# Patient Record
Sex: MALE | Race: WHITE | NOT HISPANIC OR LATINO | Employment: OTHER | URBAN - METROPOLITAN AREA
[De-identification: names, ages, dates, MRNs, and addresses within clinical notes are randomized per-mention and may not be internally consistent; named-entity substitution may affect disease eponyms.]

---

## 2023-04-24 LAB
ALANINE AMINOTRANSFERASE (SGPT) (U/L) IN SER/PLAS: 40 U/L (ref 10–52)
ALBUMIN (G/DL) IN SER/PLAS: 4.3 G/DL (ref 3.4–5)
ALKALINE PHOSPHATASE (U/L) IN SER/PLAS: 80 U/L (ref 33–136)
ASPARTATE AMINOTRANSFERASE (SGOT) (U/L) IN SER/PLAS: 26 U/L (ref 9–39)
BILIRUBIN DIRECT (MG/DL) IN SER/PLAS: 0.1 MG/DL (ref 0–0.3)
BILIRUBIN TOTAL (MG/DL) IN SER/PLAS: 0.6 MG/DL (ref 0–1.2)
CHOLESTEROL (MG/DL) IN SER/PLAS: 152 MG/DL (ref 0–199)
CHOLESTEROL IN HDL (MG/DL) IN SER/PLAS: 45.2 MG/DL
CHOLESTEROL/HDL RATIO: 3.4
CREATINE KINASE (U/L) IN SER/PLAS: 301 U/L (ref 0–325)
LDL: 84 MG/DL (ref 0–99)
PROTEIN TOTAL: 7.4 G/DL (ref 6.4–8.2)
TRIGLYCERIDE (MG/DL) IN SER/PLAS: 113 MG/DL (ref 0–149)
VLDL: 23 MG/DL (ref 0–40)

## 2023-05-20 DIAGNOSIS — K21.9 GASTRO-ESOPHAGEAL REFLUX DISEASE WITHOUT ESOPHAGITIS: ICD-10-CM

## 2023-05-22 PROBLEM — L30.9 ECZEMA: Status: ACTIVE | Noted: 2023-05-22

## 2023-05-22 PROBLEM — R42 VERTIGO: Status: ACTIVE | Noted: 2023-05-22

## 2023-05-22 PROBLEM — K21.9 CHRONIC GERD: Status: ACTIVE | Noted: 2023-05-22

## 2023-05-22 PROBLEM — E78.5 HLD (HYPERLIPIDEMIA): Status: ACTIVE | Noted: 2023-05-22

## 2023-05-22 PROBLEM — F11.20 OPIOID DEPENDENCE (MULTI): Status: ACTIVE | Noted: 2023-05-22

## 2023-05-22 PROBLEM — J06.9 VIRAL URI WITH COUGH: Status: ACTIVE | Noted: 2023-05-22

## 2023-05-22 PROBLEM — I10 BENIGN ESSENTIAL HTN: Status: ACTIVE | Noted: 2023-05-22

## 2023-05-22 PROBLEM — M43.10 DEGENERATIVE SPONDYLOLISTHESIS: Status: ACTIVE | Noted: 2023-05-22

## 2023-05-22 PROBLEM — Z86.79 HISTORY OF ATRIAL FIBRILLATION: Status: ACTIVE | Noted: 2023-05-22

## 2023-05-22 PROBLEM — M48.062 LUMBAR STENOSIS WITH NEUROGENIC CLAUDICATION: Status: ACTIVE | Noted: 2023-05-22

## 2023-05-22 PROBLEM — I25.10 CAD (CORONARY ARTERY DISEASE): Status: ACTIVE | Noted: 2023-05-22

## 2023-05-22 RX ORDER — TRIAMCINOLONE ACETONIDE 1 MG/G
OINTMENT TOPICAL
COMMUNITY
Start: 2020-01-13

## 2023-05-22 RX ORDER — MOLNUPIRAVIR 200 MG/1
CAPSULE ORAL
COMMUNITY
Start: 2022-11-26

## 2023-05-22 RX ORDER — METOPROLOL TARTRATE 25 MG/1
25 TABLET, FILM COATED ORAL 2 TIMES DAILY
COMMUNITY

## 2023-05-22 RX ORDER — AZITHROMYCIN 250 MG/1
TABLET, FILM COATED ORAL
COMMUNITY
Start: 2021-01-06

## 2023-05-22 RX ORDER — ROSUVASTATIN CALCIUM 20 MG/1
20 TABLET, COATED ORAL DAILY
COMMUNITY

## 2023-05-22 RX ORDER — CLOPIDOGREL BISULFATE 75 MG/1
75 TABLET ORAL DAILY
COMMUNITY

## 2023-05-22 RX ORDER — OMEPRAZOLE 20 MG/1
20 CAPSULE, DELAYED RELEASE ORAL DAILY PRN
COMMUNITY
End: 2023-12-08

## 2023-05-22 RX ORDER — METOPROLOL TARTRATE 100 MG/1
TABLET ORAL
COMMUNITY

## 2023-05-22 RX ORDER — MECLIZINE HYDROCHLORIDE 25 MG/1
TABLET ORAL 4 TIMES DAILY
COMMUNITY
Start: 2017-11-22

## 2023-05-22 RX ORDER — PREDNISONE 20 MG/1
TABLET ORAL
COMMUNITY
Start: 2021-01-06

## 2023-05-23 RX ORDER — OMEPRAZOLE 20 MG/1
20 CAPSULE, DELAYED RELEASE ORAL
Qty: 90 CAPSULE | Refills: 3 | Status: SHIPPED | OUTPATIENT
Start: 2023-05-23 | End: 2023-11-09 | Stop reason: SDUPTHER

## 2023-11-09 DIAGNOSIS — K21.9 GASTRO-ESOPHAGEAL REFLUX DISEASE WITHOUT ESOPHAGITIS: ICD-10-CM

## 2023-11-09 RX ORDER — OMEPRAZOLE 20 MG/1
20 CAPSULE, DELAYED RELEASE ORAL
Qty: 90 CAPSULE | Refills: 3 | Status: SHIPPED | OUTPATIENT
Start: 2023-11-09 | End: 2024-02-01 | Stop reason: SDUPTHER

## 2023-12-08 ENCOUNTER — OFFICE VISIT (OUTPATIENT)
Dept: PRIMARY CARE | Facility: CLINIC | Age: 68
End: 2023-12-08
Payer: MEDICARE

## 2023-12-08 VITALS
HEART RATE: 88 BPM | OXYGEN SATURATION: 98 % | BODY MASS INDEX: 30.33 KG/M2 | SYSTOLIC BLOOD PRESSURE: 130 MMHG | DIASTOLIC BLOOD PRESSURE: 80 MMHG | WEIGHT: 236.2 LBS | TEMPERATURE: 97.7 F

## 2023-12-08 DIAGNOSIS — M54.41 CHRONIC LEFT-SIDED LOW BACK PAIN WITH BILATERAL SCIATICA: Primary | ICD-10-CM

## 2023-12-08 DIAGNOSIS — M54.42 CHRONIC LEFT-SIDED LOW BACK PAIN WITH BILATERAL SCIATICA: Primary | ICD-10-CM

## 2023-12-08 DIAGNOSIS — G89.29 CHRONIC LEFT-SIDED LOW BACK PAIN WITH BILATERAL SCIATICA: Primary | ICD-10-CM

## 2023-12-08 PROBLEM — M77.8 ENTHESOPATHY OF LEFT FOOT: Status: ACTIVE | Noted: 2018-01-11

## 2023-12-08 PROBLEM — M79.672 LEFT FOOT PAIN: Status: ACTIVE | Noted: 2018-01-11

## 2023-12-08 PROBLEM — M76.72 PERONEAL TENDINITIS OF LEFT LOWER EXTREMITY: Status: ACTIVE | Noted: 2018-01-11

## 2023-12-08 PROBLEM — M77.52: Status: ACTIVE | Noted: 2018-01-11

## 2023-12-08 PROCEDURE — 1036F TOBACCO NON-USER: CPT | Performed by: STUDENT IN AN ORGANIZED HEALTH CARE EDUCATION/TRAINING PROGRAM

## 2023-12-08 PROCEDURE — 99213 OFFICE O/P EST LOW 20 MIN: CPT | Performed by: STUDENT IN AN ORGANIZED HEALTH CARE EDUCATION/TRAINING PROGRAM

## 2023-12-08 PROCEDURE — 1125F AMNT PAIN NOTED PAIN PRSNT: CPT | Performed by: STUDENT IN AN ORGANIZED HEALTH CARE EDUCATION/TRAINING PROGRAM

## 2023-12-08 PROCEDURE — 3079F DIAST BP 80-89 MM HG: CPT | Performed by: STUDENT IN AN ORGANIZED HEALTH CARE EDUCATION/TRAINING PROGRAM

## 2023-12-08 PROCEDURE — 1159F MED LIST DOCD IN RCRD: CPT | Performed by: STUDENT IN AN ORGANIZED HEALTH CARE EDUCATION/TRAINING PROGRAM

## 2023-12-08 PROCEDURE — 3075F SYST BP GE 130 - 139MM HG: CPT | Performed by: STUDENT IN AN ORGANIZED HEALTH CARE EDUCATION/TRAINING PROGRAM

## 2023-12-08 RX ORDER — ASPIRIN 81 MG/1
81 TABLET ORAL DAILY
COMMUNITY
Start: 2013-06-20

## 2023-12-08 ASSESSMENT — PAIN SCALES - GENERAL: PAINLEVEL: 2

## 2023-12-08 ASSESSMENT — ENCOUNTER SYMPTOMS: DEPRESSION: 0

## 2023-12-08 NOTE — PROGRESS NOTES
Subjective   Patient ID: Bob Marquez is a 67 y.o. male who presents for Follow-up (Left foot numbness and ongoing back pain.).    HPI comes in for acute flareup of chronic low back pain.  Started left side with some neuropathic pain in the left foot and left great toe.  It has moved to both sides some lateral numbness tingling neuropathy symptoms both lower extremities    Review of Systems  Constitutional: NO F, chills, or sweats  Eyes: no blurred vision or visual disturbance  ENT: no hearing loss, no congestion, no nasal discharge, no hoarseness and no sore throat.   Cardiovascular: no chest pain, no edema, no palps and no syncope.   Respiratory: no cough,no s.o.b. and no wheezing  Gastrointestinal: no abdominal pain, No C/D no N/V, no blood in stools  Genitourinary: no dysuria, no change in urinary frequency, no urinary hesitancy and no feelings of urinary urgency.   Musculoskeletal: Low back pain with neuropathic symptoms  Objective   /80 (BP Location: Left arm, Patient Position: Sitting, BP Cuff Size: Adult)   Pulse 88   Temp 36.5 °C (97.7 °F) (Temporal)   Wt 107 kg (236 lb 3.2 oz)   SpO2 98%   BMI 30.33 kg/m²     Physical Exam  gen- a & o x 3, nad, pleasant  Back--positive tight paraspinal muscles bilateral lumbosacral region negative straight leg test no bone tenderness  Assessment/Plan     1.  Chronic low back pain for many years.  Recent flareup, he has had neuropathy symptoms that started in the left lower extremity.  Repeat lumbar x-rays ordered today.  Initiate physical therapy.  If no improvement over the next 3 to 4 weeks with therapy we would consider MRI or EMG studies.

## 2023-12-08 NOTE — PATIENT INSTRUCTIONS
1.  Chronic low back pain for many years.  Recent flareup, he has had neuropathy symptoms that started in the left lower extremity.  Repeat lumbar x-rays ordered today.  Initiate physical therapy.  If no improvement over the next 3 to 4 weeks with therapy we would consider MRI or EMG studies.    Octaviano Dia DO  for questions and f/u please call office   [place] [phone] [facility] [phone]  any forms needed please fax   [place] [phone] [facility] [phone]  for Physical therapy orders can call [phone]  for Radiology orders can call [phone]  for general referrals can call [id number]  for cardiac testing can call [phone]  for GI testing call [phone]

## 2023-12-11 ENCOUNTER — HOSPITAL ENCOUNTER (OUTPATIENT)
Dept: RADIOLOGY | Facility: HOSPITAL | Age: 68
Discharge: HOME | End: 2023-12-11
Payer: MEDICARE

## 2023-12-11 DIAGNOSIS — G89.29 CHRONIC LEFT-SIDED LOW BACK PAIN WITH BILATERAL SCIATICA: ICD-10-CM

## 2023-12-11 DIAGNOSIS — M54.41 CHRONIC LEFT-SIDED LOW BACK PAIN WITH BILATERAL SCIATICA: ICD-10-CM

## 2023-12-11 DIAGNOSIS — M54.42 CHRONIC LEFT-SIDED LOW BACK PAIN WITH BILATERAL SCIATICA: ICD-10-CM

## 2023-12-11 PROCEDURE — 72110 X-RAY EXAM L-2 SPINE 4/>VWS: CPT

## 2023-12-11 PROCEDURE — 72110 X-RAY EXAM L-2 SPINE 4/>VWS: CPT | Performed by: RADIOLOGY

## 2023-12-22 ENCOUNTER — OFFICE VISIT (OUTPATIENT)
Dept: PRIMARY CARE | Facility: CLINIC | Age: 68
End: 2023-12-22
Payer: MEDICARE

## 2023-12-22 VITALS
OXYGEN SATURATION: 94 % | WEIGHT: 233.4 LBS | SYSTOLIC BLOOD PRESSURE: 110 MMHG | BODY MASS INDEX: 29.97 KG/M2 | DIASTOLIC BLOOD PRESSURE: 74 MMHG | HEART RATE: 91 BPM

## 2023-12-22 DIAGNOSIS — Z13.0 SCREENING FOR DEFICIENCY ANEMIA: ICD-10-CM

## 2023-12-22 DIAGNOSIS — Z13.6 ENCOUNTER FOR SCREENING FOR CARDIOVASCULAR DISORDERS: ICD-10-CM

## 2023-12-22 DIAGNOSIS — K21.9 GASTROESOPHAGEAL REFLUX DISEASE, UNSPECIFIED WHETHER ESOPHAGITIS PRESENT: ICD-10-CM

## 2023-12-22 DIAGNOSIS — E78.5 HYPERLIPIDEMIA, UNSPECIFIED HYPERLIPIDEMIA TYPE: ICD-10-CM

## 2023-12-22 DIAGNOSIS — M25.9: ICD-10-CM

## 2023-12-22 DIAGNOSIS — Z12.11 SCREENING FOR MALIGNANT NEOPLASM OF COLON: ICD-10-CM

## 2023-12-22 DIAGNOSIS — Z13.29 SCREENING FOR THYROID DISORDER: ICD-10-CM

## 2023-12-22 DIAGNOSIS — Z12.5 PROSTATE CANCER SCREENING: ICD-10-CM

## 2023-12-22 DIAGNOSIS — Z00.00 WELLNESS EXAMINATION: Primary | ICD-10-CM

## 2023-12-22 DIAGNOSIS — E55.9 VITAMIN D DEFICIENCY: ICD-10-CM

## 2023-12-22 PROBLEM — M48.061 SPINAL STENOSIS OF LUMBAR REGION: Status: ACTIVE | Noted: 2023-05-22

## 2023-12-22 PROBLEM — H91.93 BILATERAL HEARING LOSS: Status: ACTIVE | Noted: 2023-12-22

## 2023-12-22 PROBLEM — Z86.79 H/O HEART DISORDER: Status: ACTIVE | Noted: 2023-12-22

## 2023-12-22 LAB
25(OH)D3 SERPL-MCNC: 28 NG/ML (ref 30–100)
ALBUMIN SERPL BCP-MCNC: 4.4 G/DL (ref 3.4–5)
ALP SERPL-CCNC: 74 U/L (ref 33–136)
ALT SERPL W P-5'-P-CCNC: 26 U/L (ref 10–52)
ANION GAP SERPL CALC-SCNC: 14 MMOL/L (ref 10–20)
AST SERPL W P-5'-P-CCNC: 21 U/L (ref 9–39)
BASOPHILS # BLD AUTO: 0.08 X10*3/UL (ref 0–0.1)
BASOPHILS NFR BLD AUTO: 1.2 %
BILIRUB SERPL-MCNC: 0.6 MG/DL (ref 0–1.2)
BUN SERPL-MCNC: 17 MG/DL (ref 6–23)
CALCIUM SERPL-MCNC: 10.2 MG/DL (ref 8.6–10.6)
CHLORIDE SERPL-SCNC: 106 MMOL/L (ref 98–107)
CHOLEST SERPL-MCNC: 169 MG/DL (ref 0–199)
CHOLESTEROL/HDL RATIO: 3.2
CO2 SERPL-SCNC: 28 MMOL/L (ref 21–32)
CREAT SERPL-MCNC: 0.97 MG/DL (ref 0.5–1.3)
EOSINOPHIL # BLD AUTO: 0.21 X10*3/UL (ref 0–0.7)
EOSINOPHIL NFR BLD AUTO: 3.1 %
ERYTHROCYTE [DISTWIDTH] IN BLOOD BY AUTOMATED COUNT: 13.8 % (ref 11.5–14.5)
GFR SERPL CREATININE-BSD FRML MDRD: 85 ML/MIN/1.73M*2
GLUCOSE SERPL-MCNC: 106 MG/DL (ref 74–99)
HCT VFR BLD AUTO: 49.7 % (ref 41–52)
HDLC SERPL-MCNC: 52.5 MG/DL
HGB BLD-MCNC: 15.7 G/DL (ref 13.5–17.5)
IMM GRANULOCYTES # BLD AUTO: 0.01 X10*3/UL (ref 0–0.7)
IMM GRANULOCYTES NFR BLD AUTO: 0.1 % (ref 0–0.9)
LDLC SERPL CALC-MCNC: 86 MG/DL
LYMPHOCYTES # BLD AUTO: 2.29 X10*3/UL (ref 1.2–4.8)
LYMPHOCYTES NFR BLD AUTO: 33.5 %
MCH RBC QN AUTO: 28.3 PG (ref 26–34)
MCHC RBC AUTO-ENTMCNC: 31.6 G/DL (ref 32–36)
MCV RBC AUTO: 90 FL (ref 80–100)
MONOCYTES # BLD AUTO: 0.56 X10*3/UL (ref 0.1–1)
MONOCYTES NFR BLD AUTO: 8.2 %
NEUTROPHILS # BLD AUTO: 3.69 X10*3/UL (ref 1.2–7.7)
NEUTROPHILS NFR BLD AUTO: 53.9 %
NON HDL CHOLESTEROL: 117 MG/DL (ref 0–149)
NRBC BLD-RTO: 0 /100 WBCS (ref 0–0)
PLATELET # BLD AUTO: 303 X10*3/UL (ref 150–450)
POTASSIUM SERPL-SCNC: 4.8 MMOL/L (ref 3.5–5.3)
PROT SERPL-MCNC: 7.3 G/DL (ref 6.4–8.2)
RBC # BLD AUTO: 5.55 X10*6/UL (ref 4.5–5.9)
SODIUM SERPL-SCNC: 143 MMOL/L (ref 136–145)
TRIGL SERPL-MCNC: 155 MG/DL (ref 0–149)
TSH SERPL-ACNC: 1.06 MIU/L (ref 0.44–3.98)
VIT B12 SERPL-MCNC: 414 PG/ML (ref 211–911)
VLDL: 31 MG/DL (ref 0–40)
WBC # BLD AUTO: 6.8 X10*3/UL (ref 4.4–11.3)

## 2023-12-22 PROCEDURE — 1170F FXNL STATUS ASSESSED: CPT | Performed by: STUDENT IN AN ORGANIZED HEALTH CARE EDUCATION/TRAINING PROGRAM

## 2023-12-22 PROCEDURE — G0439 PPPS, SUBSEQ VISIT: HCPCS | Performed by: STUDENT IN AN ORGANIZED HEALTH CARE EDUCATION/TRAINING PROGRAM

## 2023-12-22 PROCEDURE — 84154 ASSAY OF PSA FREE: CPT

## 2023-12-22 PROCEDURE — 1160F RVW MEDS BY RX/DR IN RCRD: CPT | Performed by: STUDENT IN AN ORGANIZED HEALTH CARE EDUCATION/TRAINING PROGRAM

## 2023-12-22 PROCEDURE — 1036F TOBACCO NON-USER: CPT | Performed by: STUDENT IN AN ORGANIZED HEALTH CARE EDUCATION/TRAINING PROGRAM

## 2023-12-22 PROCEDURE — 99214 OFFICE O/P EST MOD 30 MIN: CPT | Performed by: STUDENT IN AN ORGANIZED HEALTH CARE EDUCATION/TRAINING PROGRAM

## 2023-12-22 PROCEDURE — 84443 ASSAY THYROID STIM HORMONE: CPT

## 2023-12-22 PROCEDURE — 36415 COLL VENOUS BLD VENIPUNCTURE: CPT

## 2023-12-22 PROCEDURE — 1159F MED LIST DOCD IN RCRD: CPT | Performed by: STUDENT IN AN ORGANIZED HEALTH CARE EDUCATION/TRAINING PROGRAM

## 2023-12-22 PROCEDURE — 82306 VITAMIN D 25 HYDROXY: CPT

## 2023-12-22 PROCEDURE — 82607 VITAMIN B-12: CPT

## 2023-12-22 PROCEDURE — 80061 LIPID PANEL: CPT

## 2023-12-22 PROCEDURE — 85025 COMPLETE CBC W/AUTO DIFF WBC: CPT

## 2023-12-22 PROCEDURE — 3078F DIAST BP <80 MM HG: CPT | Performed by: STUDENT IN AN ORGANIZED HEALTH CARE EDUCATION/TRAINING PROGRAM

## 2023-12-22 PROCEDURE — 1125F AMNT PAIN NOTED PAIN PRSNT: CPT | Performed by: STUDENT IN AN ORGANIZED HEALTH CARE EDUCATION/TRAINING PROGRAM

## 2023-12-22 PROCEDURE — 3074F SYST BP LT 130 MM HG: CPT | Performed by: STUDENT IN AN ORGANIZED HEALTH CARE EDUCATION/TRAINING PROGRAM

## 2023-12-22 PROCEDURE — G0103 PSA SCREENING: HCPCS

## 2023-12-22 PROCEDURE — 80053 COMPREHEN METABOLIC PANEL: CPT

## 2023-12-22 RX ORDER — GABAPENTIN 100 MG/1
100 CAPSULE ORAL 3 TIMES DAILY
Qty: 90 CAPSULE | Refills: 5 | Status: SHIPPED | OUTPATIENT
Start: 2023-12-22 | End: 2024-06-19

## 2023-12-22 ASSESSMENT — ENCOUNTER SYMPTOMS
OCCASIONAL FEELINGS OF UNSTEADINESS: 0
DEPRESSION: 0
LOSS OF SENSATION IN FEET: 0

## 2023-12-22 ASSESSMENT — ACTIVITIES OF DAILY LIVING (ADL)
MANAGING_FINANCES: INDEPENDENT
TAKING_MEDICATION: INDEPENDENT
GROCERY_SHOPPING: INDEPENDENT
DRESSING: INDEPENDENT
BATHING: INDEPENDENT
DOING_HOUSEWORK: INDEPENDENT

## 2023-12-22 ASSESSMENT — PAIN SCALES - GENERAL: PAINLEVEL: 2

## 2023-12-22 ASSESSMENT — PATIENT HEALTH QUESTIONNAIRE - PHQ9
1. LITTLE INTEREST OR PLEASURE IN DOING THINGS: NOT AT ALL
SUM OF ALL RESPONSES TO PHQ9 QUESTIONS 1 AND 2: 0
2. FEELING DOWN, DEPRESSED OR HOPELESS: NOT AT ALL

## 2023-12-22 NOTE — PROGRESS NOTES
Subjective   Patient ID: Bob Marquez is a 68 y.o. male who presents for Medicare Annual Wellness Visit Subsequent (He is fasting.).    HPI comes in for Medicare wellness    Review of Systems  Constitutional: NO F, chills, or sweats  Eyes: no blurred vision or visual disturbance  ENT: no hearing loss, no congestion, no nasal discharge, no hoarseness and no sore throat.   Cardiovascular: no chest pain, no edema, no palps and no syncope.   Respiratory: no cough,no s.o.b. and no wheezing  Gastrointestinal: no abdominal pain, No C/D no N/V, no blood in stools  Genitourinary: no dysuria, no change in urinary frequency, no urinary hesitancy and no feelings of urinary urgency.   Musculoskeletal: Chronic low back pain  Integumentary: no new skin lesions and no rashes.   Neurological: no difficulty walking, left foot neuropathy symptoms  Psychiatric: no anxiety, no depression, no anhedonia and no substance use disorders.   Endocrine: no recent weight gain and no recent weight loss.   Hematologic/Lymphatic: no tendency for easy bruising and no swollen glands.  Objective   /74 (BP Location: Right arm, Patient Position: Sitting, BP Cuff Size: Adult)   Pulse 91   Wt 106 kg (233 lb 6.4 oz)   SpO2 94%   BMI 29.97 kg/m²     Physical Exam  gen- a & o x 3, nad, pleasant  heent- eomi, perrla, ear canals patent, TM's non-erythematous, no fluid, frontal and maxillary sinus's nontender  neck- supple, nontender, no palpable or enlarged nodes, no thyromegaly  heart- rrr, no murmurs  lungs- cta b/l , no w/r/r  chest- symmetric, nontender  ab- soft, nontender, no palpable organomegaly, postive bowel sounds  ex's- no c/c/e  neuro- CNs 2-12 grossly intact, full sensation and strength in all extremities    Assessment/Plan     1.  Medicare wellness visit.  No concerns on exam.  Screening labs ordered today.  He is due for colonoscopy order given today.    2.  Continue routine visits with cardiology.    3.  Chronic low back pain  recent lumbar x-rays.  Neuropathic symptoms in the left foot.  He is going to see podiatry.  After that visit his podiatry thinks that the problem is coming from your back we would advise on physical therapy for the lumbar spine and then possibly MRI or EMG studies.  Or see if podiatry can treat anything for the left foot.  Also can try gabapentin.

## 2023-12-22 NOTE — PATIENT INSTRUCTIONS
1.  Medicare wellness visit.  No concerns on exam.  Screening labs ordered today.  He is due for colonoscopy order given today.    2.  Continue routine visits with cardiology.    3.  Chronic low back pain recent lumbar x-rays.  Neuropathic symptoms in the left foot.  He is going to see podiatry.  After that visit his podiatry thinks that the problem is coming from your back we would advise on physical therapy for the lumbar spine and then possibly MRI or EMG studies.  Or see if podiatry can treat anything for the left foot.  Also can try gabapentin.    Octaviano Dia DO  for questions and f/u please call office   [place] [phone] [facility] [phone]  any forms needed please fax   [place] [phone] [facility] [phone]  for Physical therapy orders can call [phone]  for Radiology orders can call [phone]  for general referrals can call [phone]  for cardiac testing can call [phone]  for GI testing call [phone]

## 2023-12-25 LAB
PSA FREE MFR SERPL: 19 %
PSA FREE SERPL-MCNC: 0.4 NG/ML
PSA SERPL IA-MCNC: 2.1 NG/ML (ref 0–4)

## 2024-01-05 ENCOUNTER — EVALUATION (OUTPATIENT)
Dept: PHYSICAL THERAPY | Facility: CLINIC | Age: 69
End: 2024-01-05
Payer: MEDICARE

## 2024-01-05 DIAGNOSIS — M54.41 CHRONIC LEFT-SIDED LOW BACK PAIN WITH BILATERAL SCIATICA: ICD-10-CM

## 2024-01-05 DIAGNOSIS — M54.42 CHRONIC LEFT-SIDED LOW BACK PAIN WITH BILATERAL SCIATICA: ICD-10-CM

## 2024-01-05 DIAGNOSIS — G89.29 CHRONIC LEFT-SIDED LOW BACK PAIN WITH BILATERAL SCIATICA: ICD-10-CM

## 2024-01-05 PROCEDURE — 97161 PT EVAL LOW COMPLEX 20 MIN: CPT | Mod: GP

## 2024-01-05 NOTE — PROGRESS NOTES
Physical Therapy    Physical Therapy Evaluation and Treatment    Patient Name: Bob Marquez  MRN: [id number]  Today's Date: 1/5/2024  Time Calculation  Start Time: 1015  Stop Time: 1045  Time Calculation (min): 30 min    Insurance reviewed   Visit number: 1  [place] [employer]   Med Nec   No Auth  $20 copay     Referring Physician: Dr. Octaviano Dia, DO      Assessment:     Bob Marquez  is a 68 y.o. old patient who participated in a physical therapy evaluation today due to chronic LBP and recent onset of L foot paresthesias. Pt presents with signs and symptoms consistent with lumbar radiculopathy, no direction preference noted today. his impairments include: balance deficits, sensation loss, tenderness, strength deficits, pain, ROM deficits.  Due to these impairments, he has the following functional limitations and participation restrictions: Gait deviations, stair negotiation, transfers, performing household/recreational/occupational activities, and performing some ADLS.  Skilled physical therapy services are appropriate and beneficial in order to achieve measurable and meaningful change in the above objective tests and measures. Utilization of skilled physical therapy services will aid in advancing his functional status and attaining his therapy-related goals. The patient verbalized understanding and is in agreement with all goals and plan of care.  Plan of care was developed with input and agreement by the patient    Plan:  Treatment/Interventions: Aquatic therapy, Cryotherapy, Education/ Instruction, Electrical stimulation, Gait training, Manual therapy, Neuromuscular re-education, Therapeutic activities, Taping techniques, Therapeutic exercises  PT Plan: Skilled PT  PT Frequency: 1 time per week  Duration: 3 visits  Rehab Potential: Fair (Pt expresses disinterest/dissatisfaction with prior PT. Verbalizes that he is here to get MRI approved. This may limit progress.)  Plan of Care Agreement: Patient  NV:  "trial nerve floss, manual traction, core strength       Current Problem:  1. Chronic left-sided low back pain with bilateral sciatica  Referral to Physical Therapy    Follow Up In Physical Therapy          Subjective    Onset: mid December   JINNY: none, gradual onset   Medial L great toe numb and 3rd and 4th toes - constant   Ball of foot, feels like something in shoe   Hx of LBP for 20-30 years, comes and goes   Constant   Mid low back, radiates down L leg to foot    5/10 at worst; 0/10 at best  Worse with: nothing specific   Better with: Vicodin, no longer taking    Medical management: gapapentin, had Xray, needs PT before MRI     Pt goals for therapy: \"to be done with it, I don't like PT\"    Has fallen in the past 6 months, rolled ankle. Otherwise no injuries.     Work: retired     Xray results copied from EMR:   \"FINDINGS:   Mild levoscoliosis noted centered in the upper lumbar region. Mild   dextroscoliosis noted centered in the lower lumbar region. Grade   retrolisthesis at L2-3. Moderate spondylosis at L2-3 with disc height   loss and osteophytes with endplate sclerosis. Mild spondylosis at   L1-2 and L3-4 as well as L4-5. Moderate facet arthropathy at L4-5 and   L5-S1 and mild facet arthropathy at L3-4. Atherosclerosis of the   abdominal aorta. No spondylolysis on the oblique views.   Vertebral body heights are preserved. Posterior elements are intact.         1. Moderate L2-3 spondylosis. Moderate L4-5 and L5-S1 facet   arthropathy. Additional degenerative changes as above   2. Mild S shaped lumbar spine scoliosis.       MACRO:   None. \"    Medical History Form: Reviewed (scanned into chart)    Precautions: hx neck CA, hx MI 2008; +HTN, Denies: current CA, pacemaker, DM, HTN, blood thinner medication use, latex allergy, epilepsy/seizures, or other known cardio/neuro/pulmonary problems       Objective     Numbness/Tingling/Paresthesia: constant L foot     Dermatomes B LE:  EC WNL to light touch bilat unless " otherwise noted  Mid-Anterior Thigh (L2):  Medial Knee (L3):  Medial Malleolus (L4):  Dorsal Second Toe Web Space (L5): numb on L   Lateral Malleolus (S1):     Myotomes/Strength (MMT in sitting):  Hip Flex (L2) R/L:  4+ / 4+   Hip Add R/L: 4+ / 4+   Hip Abd R/L: 4+ / 4+   Knee Ext (L3) R/L: 4+ / 4+   Knee Flex R/L: 4+ / 4+   Ankle DF (L4) R/L: 4+ / 4+   Ankle PF (S1) R/L:  4+ / 4+    Great Toe Ext (L5) R/L: 4+ / 4+     Range of Motion/Repeated Movements:  Standing Lumbar Flexion: min limited anterior shins   Repeated Loaded Flexion: NE on toe/back  Standing Lumbar Ext: mod limited   Repeated Loaded Ext: INC LBP, NE on toe   Side Bend R/L: INC LBP /  INC LBP   Rotation R/L:  INC LBP / NE   LTR R/L: INC/DEC/No change in pain  Supine Flex (SKTC) R/L: R NE, L INC LBP and toe NT   Prone Lie: INC LBP  CHRIS: INC LBP    Outcome Measures:  Other Measures  Oswestry Disablity Index (LEXI): Pt declined to fill out    Treatments:    OP EDUCATION:  Educated pt regarding benefit and purpose of skilled PT services along with results of examination findings and how this correlates to their chief complaint. Pt became dizzy with sit>supine, has hx vertigo. Advised him that he can get order for PT for dizziness.    Goals:    Bob G Alma will report 50% reduction in pain during ADLs to improve tolerance to household activities.    Bob Marquez to increase core/B LE strength in deficient muscles by >/= 1/2 MMT grade to improve dynamic stability during household/occupational/recreational tasks.    Bob Marquez will increase lumbar mobility to WFL/symmetrical without pain for unlimited ability to perform home household/occupational/recreational tasks.    Bob STEFANI Alma will demonstrate appropriate lifting technique with <2 cues for correction using golfer's lift to pick objects off the floor (at home, and light objects at work) and with moving/handling heavy packages while protecting integrity of low back to safely  perform ADLs/return to work.    Bob Marquez will demonstrate independence and report compliance with HEP to facilitate independent rehab program upon discharge.

## 2024-01-12 ENCOUNTER — APPOINTMENT (OUTPATIENT)
Dept: PHYSICAL THERAPY | Facility: CLINIC | Age: 69
End: 2024-01-12
Payer: MEDICARE

## 2024-01-18 PROBLEM — M54.42 CHRONIC LEFT-SIDED LOW BACK PAIN WITH BILATERAL SCIATICA: Status: ACTIVE | Noted: 2024-01-18

## 2024-01-18 PROBLEM — M54.41 CHRONIC LEFT-SIDED LOW BACK PAIN WITH BILATERAL SCIATICA: Status: ACTIVE | Noted: 2024-01-18

## 2024-01-18 PROBLEM — M54.42 CHRONIC LOW BACK PAIN WITH BILATERAL SCIATICA: Status: ACTIVE | Noted: 2024-01-18

## 2024-01-18 PROBLEM — G89.29 CHRONIC LEFT-SIDED LOW BACK PAIN WITH BILATERAL SCIATICA: Status: ACTIVE | Noted: 2024-01-18

## 2024-01-18 PROBLEM — G89.29 CHRONIC LOW BACK PAIN WITH BILATERAL SCIATICA: Status: ACTIVE | Noted: 2024-01-18

## 2024-01-18 PROBLEM — M54.41 CHRONIC LOW BACK PAIN WITH BILATERAL SCIATICA: Status: ACTIVE | Noted: 2024-01-18

## 2024-01-18 NOTE — PROGRESS NOTES
"Physical Therapy Treatment Note    Patient Name: Bob Marquez  MRN: [id number]  Today's Date: 1/19/2024  Time Calculation  Start Time: 1045  Stop Time: 1135  Time Calculation (min): 50 min    Insurance:    Visit number: 2 (updated 01/19/24)  [employer]   Med Nec   No Auth  $20 copay      Referring Physician: Dr. Octaviano Dia, DO      Assessment:   Pt pain free at time of PT appt today, however, when sxs are present, pt is in standing position on hard floors.  Comfort reported in supine: updated HEP w unloaded flexion bias stretching and core ex: pt tolerated w/ appropriate challenge, no elevated sxs reported. Agree  L foot sxs most likely due to peroneal tendonitis, as directional testing on eval nor sciatic n. Flossing today did not influence L foot sxs.  Increased tension evident w/ L sciatic n. Flossing vs R.  Pt tender on palpation to peroneal brevis insertion.  Pt receptive towards all info provided today.      Plan:  Monitor sxs to HEP update, assess if pt needs to schedule one add'l visit.(Pt advised to call his insurance company)      Current Problem  1. Chronic left-sided low back pain with bilateral sciatica  Follow Up In Physical Therapy          Precautions: hx neck CA, hx MI 2008; +HTN, Denies: current CA, pacemaker, DM, HTN, blood thinner medication use, latex allergy, epilepsy/seizures, or other known cardio/neuro/pulmonary problems        Subjective:     Patient reports he saw podiatry: was dx'd was L peroneal tenonitis and possible tear.  Pt ?ing whether L foot sxs originating from this vs L spine.  See pain section.     Pain   0/10 currently; standing/walking on concrete floors in stores x 20 min will elevate pain 3-4/10 \"like a toothache, annoying\"    Performing HEP?: NA    Objective:         Treatment Performed:    Therapeutic Exercise:  50 minutes  3 units   NV: trial nerve floss: - Slump, \"feels like its getting the blood flowing\": pt to trial at home  Access Code: [email or web address]  - Supine " Single Knee to Chest Stretch  - 1-2 x daily - 7 x weekly - 3 sets - 30 seconds hold  - Supine Lower Trunk Rotation  - 1-2 x daily - 7 x weekly - 10 reps - 10 seconds hold  - Supine Hip Adduction Isometric with Ball  - 1 x daily - 3 x weekly - 2 sets - 10-15 reps  - Hooklying Clamshell with Blue  Resistance  - 1 x daily - 3 x weekly - 2 sets - 10-15 reps  - Sidelying Hip Abduction  - 1 x daily - 3 x weekly - 2 sets - 10-15 reps  - Ankle Eversion with Green Resistance  - 1 x daily - 3 x weekly - 1 sets - 20-30 reps  - Ankle Inversion with green Resistance (Mirrored)  - 1 x daily - 3 x weekly - 1 sets - 20-30 reps  - Ankle Dorsiflexion with green Resistance  - 1 x daily - 3 x weekly - 1 sets - 20-30 reps  - Ankle and Toe Plantarflexion with green Resistance  - 1 x daily - 3 x weekly - 1 sets - 20-30 reps  - Seated Sciatic Tensioner  - 2 x daily - 7 x weekly - 2 sets - 10 reps  - Shoulder extension with blue resistance - Neutral  - 1 x daily - 3 x weekly - 2 sets - 10-15 reps  - Reverse Chop with blue Resistance  - 1 x daily - 3 x weekly - 2 sets - 10-15 reps  - Standing Diagonal Chop  w/ blue TB- 1 x daily - 3 x weekly - 2 sets - 10-15 reps      therapeutic Activity:   minutes   units   1/19: discussed use of lumbar support, lifting mechanics during there ex, pt appears knowledgeable    Modalities:  Good relief w/ ICE when sxs present per pt    Pt education:    Hep instruction, ex cues, ergonomics training

## 2024-01-19 ENCOUNTER — TREATMENT (OUTPATIENT)
Dept: PHYSICAL THERAPY | Facility: CLINIC | Age: 69
End: 2024-01-19
Payer: MEDICARE

## 2024-01-19 DIAGNOSIS — M54.42 CHRONIC LEFT-SIDED LOW BACK PAIN WITH BILATERAL SCIATICA: Primary | ICD-10-CM

## 2024-01-19 DIAGNOSIS — M54.41 CHRONIC LEFT-SIDED LOW BACK PAIN WITH BILATERAL SCIATICA: Primary | ICD-10-CM

## 2024-01-19 DIAGNOSIS — G89.29 CHRONIC LEFT-SIDED LOW BACK PAIN WITH BILATERAL SCIATICA: Primary | ICD-10-CM

## 2024-01-19 PROCEDURE — 97110 THERAPEUTIC EXERCISES: CPT | Mod: GP,CQ

## 2024-01-26 ENCOUNTER — CLINICAL SUPPORT (OUTPATIENT)
Dept: PHYSICAL THERAPY | Facility: CLINIC | Age: 69
End: 2024-01-26
Payer: MEDICARE

## 2024-01-26 DIAGNOSIS — M54.42 CHRONIC LEFT-SIDED LOW BACK PAIN WITH BILATERAL SCIATICA: Primary | ICD-10-CM

## 2024-01-26 DIAGNOSIS — M54.41 CHRONIC LEFT-SIDED LOW BACK PAIN WITH BILATERAL SCIATICA: Primary | ICD-10-CM

## 2024-01-26 DIAGNOSIS — G89.29 CHRONIC LEFT-SIDED LOW BACK PAIN WITH BILATERAL SCIATICA: Primary | ICD-10-CM

## 2024-01-26 PROCEDURE — 97110 THERAPEUTIC EXERCISES: CPT | Mod: GP

## 2024-01-26 NOTE — PROGRESS NOTES
Physical Therapy    Physical Therapy Treatment Note    Patient Name: Bob Marquez  MRN: [id number]  Today's Date: 1/26/2024  Time Calculation  Start Time: 1058  Stop Time: 1129  Time Calculation (min): 31 min    Insurance:  Visit number: 3 (updated 01/26/24)  [place] [employer]   Med Nec   No Auth  $20 copay      Referring Physician: Dr. Octaviano Dia, DO      Assessment:   Held most of LB HEP due to feeling increased pain 30 minutes after performing at home, but pt agreeable to review ankle HEP and standing theraband resistance exercises. Min cues needed for form. Low back fatigue reported during standing exercises, but no increase in pain reported. Pt reports muscle fatigue with seated ankle resistance exercises. Heel raises challenging, ROM limited and requires UE support.     Plan:  Re-assess nv      Current Problem  1. Chronic left-sided low back pain with bilateral sciatica  Follow Up In Physical Therapy        Subjective:   - Slipped on ice and landed on L side a few days ago. Back sore from this. Declines to give pain rating. Has not had any radiating pain down LLE lately.  - L foot pain at start of session. Declines to give pain rating.   - Numbness in toes at start of session.   - Feels foot is getting worse.   - States back pain is worse 30 minutes after performing HEP. But likes standing BTB exercises.  - States he does not remember talking about lumbar support last visit.     HEP compliance: partially: has completed LB ex 2 times since last; and did not do any of ankle exercises.    Precautions: hx neck CA, hx MI 2008; +HTN, Denies: current CA, pacemaker, DM, HTN, blood thinner medication use, latex allergy, epilepsy/seizures, or other known cardio/neuro/pulmonary problems      Treatment Performed:    Therapeutic Exercise 31 min 2 units  Access Code: [email or web address]  - Ankle Eversion with Green Resistance  - 1 x daily - 3 x weekly - 1 sets - 20-30 reps  - Ankle Inversion with green Resistance (Mirrored)  - 1 x  daily - 3 x weekly - 1 sets - 20-30 reps  - Ankle Dorsiflexion with green Resistance  - 1 x daily - 3 x weekly - 1 sets - 20-30 reps  - Ankle and Toe Plantarflexion with green Resistance  - 1 x daily - 3 x weekly - 1 sets - 20-30 reps  - standing heel raises with wall support x 10 - HEP    X10 of each performed ea side today:  - Shoulder extension with blue resistance - Neutral  - 1 x daily - 3 x weekly - 2 sets - 10-15 reps  - Reverse Chop with blue Resistance  - 1 x daily - 3 x weekly - 2 sets - 10-15 reps  - Standing Diagonal Chop  w/ blue TB- 1 x daily - 3 x weekly - 2 sets - 10-15 reps    Not performed today as pt states HEP increases his pain:   - Supine Single Knee to Chest Stretch  - 1-2 x daily - 7 x weekly - 3 sets - 30 seconds hold  - Supine Lower Trunk Rotation  - 1-2 x daily - 7 x weekly - 10 reps - 10 seconds hold  - Supine Hip Adduction Isometric with Ball  - 1 x daily - 3 x weekly - 2 sets - 10-15 reps  - Hooklying Clamshell with Blue  Resistance  - 1 x daily - 3 x weekly - 2 sets - 10-15 reps  - Sidelying Hip Abduction  - 1 x daily - 3 x weekly - 2 sets - 10-15 reps  - Seated Sciatic Tensioner  - 2 x daily - 7 x weekly - 2 sets - 10 reps    Pt education:    Hep instruction, ex cues

## 2024-02-01 DIAGNOSIS — K21.9 GASTRO-ESOPHAGEAL REFLUX DISEASE WITHOUT ESOPHAGITIS: ICD-10-CM

## 2024-02-02 RX ORDER — OMEPRAZOLE 20 MG/1
20 CAPSULE, DELAYED RELEASE ORAL
Qty: 90 CAPSULE | Refills: 3 | Status: SHIPPED | OUTPATIENT
Start: 2024-02-02 | End: 2025-02-01

## 2024-02-09 ENCOUNTER — TREATMENT (OUTPATIENT)
Dept: PHYSICAL THERAPY | Facility: CLINIC | Age: 69
End: 2024-02-09
Payer: MEDICARE

## 2024-02-09 DIAGNOSIS — G89.29 CHRONIC LEFT-SIDED LOW BACK PAIN WITH BILATERAL SCIATICA: Primary | ICD-10-CM

## 2024-02-09 DIAGNOSIS — M54.42 CHRONIC LEFT-SIDED LOW BACK PAIN WITH BILATERAL SCIATICA: Primary | ICD-10-CM

## 2024-02-09 DIAGNOSIS — M54.41 CHRONIC LEFT-SIDED LOW BACK PAIN WITH BILATERAL SCIATICA: Primary | ICD-10-CM

## 2024-02-09 PROCEDURE — 97530 THERAPEUTIC ACTIVITIES: CPT | Mod: GP

## 2024-02-09 NOTE — PROGRESS NOTES
"  Physical Therapy    Physical Therapy Treatment Note    Patient Name: Bob Marquez  MRN: [id number]  Today's Date: 2/9/2024  Time Calculation  Start Time: 1104  Stop Time: 1130  Time Calculation (min): 26 min    Insurance:  Visit number: 4 (updated 02/09/24)  [place] [employer]   Med Nec   No Auth  $20 copay      Referring Physician: Dr. Octaviano Dia, DO      Assessment:   -Bob Marquez has completed 4 sessions of physical therapy treatment with emphasis upon education, strength, ROM. He reports no improvement in his L foot pain or N/T since starting physical therapy. His back pain comes and goes and we have not been able to assess response to PT for LBP as it has not been present. Feel his lateral left foot pain is not related to his low back - more likely peroneal tendonitis or bone deformity noted on recent Xray by podiatrist. This lateral foot pain is patient's chief complaint. Therefore recommend he return to podiatrist: has has appt scheduled end of February. Therefore, skilled PT services are no longer warranted/necessary. Bob Marquez to be discharged from PT services and back to care under referring physician. Bob Marquez voiced agreement and satisfaction with this plan.        Plan: discharge patient.      Current Problem  1. Chronic left-sided low back pain with bilateral sciatica  Follow Up In Physical Therapy        Subjective:   - went golfing yesterday with no issue with low back.   - denies back pain at start of session.   - L foot is \"horrible, the worst it's every been, I stopped doing all the exercises for it.\"  - declines to give pain rating.  - not improving with PT, ready to be discharged today   - states he feels pain management is a waste of time, had negative experience 6 years ago, does not want to return   - states he has follow ups with Dr. Dia and podiatrist coming up   - has not been able to play basketball since foot pain started     HEP compliance: " no    Precautions: hx neck CA, hx MI 2008; +HTN, Denies: current CA, pacemaker, DM, HTN, blood thinner medication use, latex allergy, epilepsy/seizures, or other known cardio/neuro/pulmonary problems      Objective      Numbness/Tingling/Paresthesia: constant L foot      Dermatomes B LE:  EC WNL to light touch bilat unless otherwise noted  Mid-Anterior Thigh (L2):   Medial Knee (L3):  Medial Malleolus (L4):  Dorsal Second Toe Web Space (L5): numb on L   Lateral Malleolus (S1):      Myotomes/Strength (MMT in sitting):  Hip Flex (L2) R/L:  4+ / 4+ >> 4+ / 4+   Hip Add R/L: 4+ / 4+ >> 4+ / 4+   Hip Abd R/L: 4+ / 4+ >>  4+ / 4+    Knee Ext (L3) R/L: 4+ / 4+ >>  4+ / 4+   Knee Flex R/L: 4+ / 4+ >>  4+ / 4+    Ankle DF (L4) R/L: 4+ / 4+ >> 4+ / 4+   Ankle PF (S1) R/L:  4+ / 4+  >> 4+ / 4+   Great Toe Ext (L5) R/L: 4+ / 4+ >> 4+ / 4+   Ankle INV R/L: 4+ / 4+   Ankle EVR R/L: 4+ / 4+     Range of Motion/Repeated Movements:  Standing Lumbar Flexion: min limited anterior shins >> min limited to anterior shins   Repeated Loaded Flexion: NE on toe/back >> NE on foot/back   Standing Lumbar Ext: mod limited >> mod limited   Repeated Loaded Ext: INC LBP, NE on toe >>  NE on foot/back  Side Bend R/L: INC LBP /  INC LBP >> mod limited NE on pain / mod limited NE on pain   Rotation R/L:  INC LBP / NE >> min limited NE / min limited NE   LTR R/L: INC/DEC/No change in pain >> NT  Supine Flex (SKTC) R/L: R NE, L INC LBP and toe NT >> NT  Prone Lie: INC LBP >> NT  CHRIS: INC LBP >> NT      Outcome Measures:   Other Measures  Oswestry Disablity Index (LEXI): Pt declined to fill out >> 11, only 8 of 10 questions completed     Treatment Performed:    Therapeutic Activity: 26 minutes 2 units  Assessment of pt's POC goals completed today- see objective, goals, and assessment section. Educated pt regarding results of examination findings and discussed next steps in POC progression. Recommend discontinue foot/ankle HEP.     Therapeutic  Exercise  Access Code: [email or web address]    Goals:     Bob OK Alma will report 50% reduction in pain during ADLs to improve tolerance to household activities. 02/09/24 GOAL NOT MET      Bob Marquez to increase core/B LE strength in deficient muscles by >/= 1/2 MMT grade to improve dynamic stability during household/occupational/recreational tasks. 02/09/24 GOAL MET, 4+/5 all LE strength      Bob Marquez will increase lumbar mobility to WFL/symmetrical without pain for unlimited ability to perform home household/occupational/recreational tasks. 02/09/24 GOAL NOT MET      Bob Marquez will demonstrate appropriate lifting technique with <2 cues for correction using golfer's lift to pick objects off the floor (at home, and light objects at work) and with moving/handling heavy packages while protecting integrity of low back to safely perform ADLs/return to work. 02/09/24 not assessed      Bob Marquez will demonstrate independence and report compliance with HEP to facilitate independent rehab program upon discharge. 02/09/24 GOAL NOT MET, pt unable to tolerate HEP.

## 2024-10-04 ENCOUNTER — OFFICE VISIT (OUTPATIENT)
Dept: PRIMARY CARE | Facility: CLINIC | Age: 69
End: 2024-10-04
Payer: MEDICARE

## 2024-10-04 VITALS
HEART RATE: 79 BPM | DIASTOLIC BLOOD PRESSURE: 80 MMHG | OXYGEN SATURATION: 97 % | TEMPERATURE: 97.3 F | SYSTOLIC BLOOD PRESSURE: 120 MMHG | WEIGHT: 231 LBS | HEIGHT: 74 IN | BODY MASS INDEX: 29.65 KG/M2

## 2024-10-04 DIAGNOSIS — E55.9 VITAMIN D DEFICIENCY: ICD-10-CM

## 2024-10-04 DIAGNOSIS — R53.83 FATIGUE, UNSPECIFIED TYPE: Primary | ICD-10-CM

## 2024-10-04 PROCEDURE — 82607 VITAMIN B-12: CPT

## 2024-10-04 PROCEDURE — 3079F DIAST BP 80-89 MM HG: CPT | Performed by: NURSE PRACTITIONER

## 2024-10-04 PROCEDURE — 82306 VITAMIN D 25 HYDROXY: CPT

## 2024-10-04 PROCEDURE — 1159F MED LIST DOCD IN RCRD: CPT | Performed by: NURSE PRACTITIONER

## 2024-10-04 PROCEDURE — 3074F SYST BP LT 130 MM HG: CPT | Performed by: NURSE PRACTITIONER

## 2024-10-04 PROCEDURE — 3008F BODY MASS INDEX DOCD: CPT | Performed by: NURSE PRACTITIONER

## 2024-10-04 PROCEDURE — 81003 URINALYSIS AUTO W/O SCOPE: CPT

## 2024-10-04 PROCEDURE — 93000 ELECTROCARDIOGRAM COMPLETE: CPT | Performed by: NURSE PRACTITIONER

## 2024-10-04 PROCEDURE — 1036F TOBACCO NON-USER: CPT | Performed by: NURSE PRACTITIONER

## 2024-10-04 PROCEDURE — 80053 COMPREHEN METABOLIC PANEL: CPT

## 2024-10-04 PROCEDURE — 1126F AMNT PAIN NOTED NONE PRSNT: CPT | Performed by: NURSE PRACTITIONER

## 2024-10-04 PROCEDURE — 99214 OFFICE O/P EST MOD 30 MIN: CPT | Performed by: NURSE PRACTITIONER

## 2024-10-04 PROCEDURE — 83735 ASSAY OF MAGNESIUM: CPT

## 2024-10-04 PROCEDURE — 85025 COMPLETE CBC W/AUTO DIFF WBC: CPT

## 2024-10-04 ASSESSMENT — ENCOUNTER SYMPTOMS
FATIGUE: 1
GASTROINTESTINAL NEGATIVE: 1
WHEEZING: 0
WEAKNESS: 0
MUSCULOSKELETAL NEGATIVE: 1
PALPITATIONS: 0
HEADACHES: 0
TREMORS: 0
DEPRESSION: 0
COUGH: 0
POLYPHAGIA: 0
SHORTNESS OF BREATH: 0
POLYDIPSIA: 0

## 2024-10-04 ASSESSMENT — PAIN SCALES - GENERAL: PAINLEVEL: 0-NO PAIN

## 2024-10-04 NOTE — PATIENT INSTRUCTIONS
Your EKG shows no ischemic changes.  Unsure of the exact cause of your fatigue and call you with results.   Your urinalysis is negative.    Healthy diet make sure you drink plenty of water  Continue to be active.-Walking,golfing.  Advise you to start to lower the consumption of alcohol that you are drinking regularly.  Please contact your cardiologist to schedule an appointment there next available further evaluation of your fatigue.       -On home Valsartan 80mg, therapeutic interchange to Losartan 50mg daily  -On home Metoprolol tartrate 12.5mg BID, will continue with hold parameters -On home Valsartan 80mg, therapeutic interchange to Losartan 50mg daily  -On home Metoprolol tartrate 12.5mg BID, will continue with hold parameters

## 2024-10-04 NOTE — PROGRESS NOTES
"Subjective   Patient ID: Bob Marquez is a 68 y.o. male who presents for Fatigue (Fatigue for 1 week).    Fatigue  Associated symptoms include fatigue. Pertinent negatives include no chest pain, coughing, headaches or weakness.        Previous patient of Dr. Dia new to me presents to clinic as a walk-in with a complaint of constant fatigue and sweating x 1 week.    Patient with past medical history of hypertension,  Hyperlipidemia, atrial fibrillation, MI/stents 2008, GERD    He denies any other symptoms including chest pains feeling short of breath abdominal pain nausea vomiting diarrhea headaches dizziness..    Patient states he drinks about 5 beers a day about 5 times a week.    He tells me he is being  followed by cardiologist Dr. Lopez yearly.    In office EKG sinus rhythm no ST-T wave elevations noted.    He states his appetite is normal bowel and bladder is normal.      Review of Systems   Constitutional:  Positive for fatigue.   HENT: Negative.     Respiratory:  Negative for cough, shortness of breath and wheezing.    Cardiovascular:  Negative for chest pain, palpitations and leg swelling.   Gastrointestinal: Negative.    Endocrine: Negative for polydipsia, polyphagia and polyuria.   Musculoskeletal: Negative.    Skin: Negative.    Neurological:  Negative for tremors, weakness and headaches.       Objective   /80 (BP Location: Left leg, Patient Position: Sitting, BP Cuff Size: Adult)   Pulse 79   Temp 36.3 °C (97.3 °F)   Ht 1.88 m (6' 2\")   Wt 105 kg (231 lb)   SpO2 97%   BMI 29.66 kg/m²     Physical Exam  Vitals reviewed.   Constitutional:       Appearance: Normal appearance. He is not ill-appearing or toxic-appearing.   Cardiovascular:      Rate and Rhythm: Normal rate and regular rhythm.   Pulmonary:      Effort: Pulmonary effort is normal.      Breath sounds: Normal breath sounds.   Abdominal:      General: Bowel sounds are normal.      Palpations: Abdomen is soft.   Musculoskeletal:  "        General: Normal range of motion.      Cervical back: Normal range of motion and neck supple.   Skin:     General: Skin is warm and dry.   Neurological:      General: No focal deficit present.      Mental Status: He is alert and oriented to person, place, and time.         Assessment/Plan   Diagnoses and all orders for this visit:  Fatigue, unspecified type  -     ECG 12 lead (Clinic Performed)-NSR  -     CBC and Auto Differential  -     Comprehensive metabolic panel  -     Magnesium  -     Vitamin D 25-Hydroxy,Total (for eval of Vitamin D levels)  -     Vitamin B12  -     Urinalysis with Reflex Culture and Microscopic; Future  -Advised healthy diet regular exercises as tolerated.    -Increase water intake.  -Follow-up with cardiology  -Over-the-counter multivitamin  Vitamin D deficiency  -     Vitamin D 25-Hydroxy,Total (for eval of Vitamin D levels)

## 2024-10-05 LAB
25(OH)D3 SERPL-MCNC: 46 NG/ML (ref 30–100)
ALBUMIN SERPL BCP-MCNC: 4.3 G/DL (ref 3.4–5)
ALP SERPL-CCNC: 75 U/L (ref 33–136)
ALT SERPL W P-5'-P-CCNC: 46 U/L (ref 10–52)
ANION GAP SERPL CALC-SCNC: 15 MMOL/L (ref 10–20)
APPEARANCE UR: ABNORMAL
AST SERPL W P-5'-P-CCNC: 33 U/L (ref 9–39)
BASOPHILS # BLD AUTO: 0.09 X10*3/UL (ref 0–0.1)
BASOPHILS NFR BLD AUTO: 1.1 %
BILIRUB SERPL-MCNC: 0.4 MG/DL (ref 0–1.2)
BILIRUB UR STRIP.AUTO-MCNC: NEGATIVE MG/DL
BUN SERPL-MCNC: 21 MG/DL (ref 6–23)
CALCIUM SERPL-MCNC: 9.5 MG/DL (ref 8.6–10.6)
CHLORIDE SERPL-SCNC: 103 MMOL/L (ref 98–107)
CO2 SERPL-SCNC: 26 MMOL/L (ref 21–32)
COLOR UR: ABNORMAL
CREAT SERPL-MCNC: 1 MG/DL (ref 0.5–1.3)
EGFRCR SERPLBLD CKD-EPI 2021: 82 ML/MIN/1.73M*2
EOSINOPHIL # BLD AUTO: 0.24 X10*3/UL (ref 0–0.7)
EOSINOPHIL NFR BLD AUTO: 2.9 %
ERYTHROCYTE [DISTWIDTH] IN BLOOD BY AUTOMATED COUNT: 13.9 % (ref 11.5–14.5)
GLUCOSE SERPL-MCNC: 108 MG/DL (ref 74–99)
GLUCOSE UR STRIP.AUTO-MCNC: NORMAL MG/DL
HCT VFR BLD AUTO: 46.9 % (ref 41–52)
HGB BLD-MCNC: 14.7 G/DL (ref 13.5–17.5)
HOLD SPECIMEN: NORMAL
IMM GRANULOCYTES # BLD AUTO: 0.02 X10*3/UL (ref 0–0.7)
IMM GRANULOCYTES NFR BLD AUTO: 0.2 % (ref 0–0.9)
KETONES UR STRIP.AUTO-MCNC: NEGATIVE MG/DL
LEUKOCYTE ESTERASE UR QL STRIP.AUTO: NEGATIVE
LYMPHOCYTES # BLD AUTO: 2.03 X10*3/UL (ref 1.2–4.8)
LYMPHOCYTES NFR BLD AUTO: 24.9 %
MAGNESIUM SERPL-MCNC: 1.95 MG/DL (ref 1.6–2.4)
MCH RBC QN AUTO: 27.6 PG (ref 26–34)
MCHC RBC AUTO-ENTMCNC: 31.3 G/DL (ref 32–36)
MCV RBC AUTO: 88 FL (ref 80–100)
MONOCYTES # BLD AUTO: 0.82 X10*3/UL (ref 0.1–1)
MONOCYTES NFR BLD AUTO: 10.1 %
NEUTROPHILS # BLD AUTO: 4.95 X10*3/UL (ref 1.2–7.7)
NEUTROPHILS NFR BLD AUTO: 60.8 %
NITRITE UR QL STRIP.AUTO: NEGATIVE
NRBC BLD-RTO: 0 /100 WBCS (ref 0–0)
PH UR STRIP.AUTO: 5 [PH]
PLATELET # BLD AUTO: 300 X10*3/UL (ref 150–450)
POTASSIUM SERPL-SCNC: 4.9 MMOL/L (ref 3.5–5.3)
PROT SERPL-MCNC: 6.8 G/DL (ref 6.4–8.2)
PROT UR STRIP.AUTO-MCNC: NEGATIVE MG/DL
RBC # BLD AUTO: 5.32 X10*6/UL (ref 4.5–5.9)
RBC # UR STRIP.AUTO: NEGATIVE /UL
SODIUM SERPL-SCNC: 139 MMOL/L (ref 136–145)
SP GR UR STRIP.AUTO: 1.02
UROBILINOGEN UR STRIP.AUTO-MCNC: NORMAL MG/DL
VIT B12 SERPL-MCNC: 537 PG/ML (ref 211–911)
WBC # BLD AUTO: 8.2 X10*3/UL (ref 4.4–11.3)

## 2025-01-21 ENCOUNTER — OFFICE VISIT (OUTPATIENT)
Dept: PRIMARY CARE | Facility: CLINIC | Age: 70
End: 2025-01-21
Payer: MEDICARE

## 2025-01-21 VITALS
WEIGHT: 239.2 LBS | HEIGHT: 74 IN | HEART RATE: 91 BPM | DIASTOLIC BLOOD PRESSURE: 65 MMHG | BODY MASS INDEX: 30.7 KG/M2 | SYSTOLIC BLOOD PRESSURE: 122 MMHG | OXYGEN SATURATION: 93 %

## 2025-01-21 DIAGNOSIS — K21.9 GASTROESOPHAGEAL REFLUX DISEASE, UNSPECIFIED WHETHER ESOPHAGITIS PRESENT: Primary | ICD-10-CM

## 2025-01-21 DIAGNOSIS — J02.9 SORE THROAT: ICD-10-CM

## 2025-01-21 PROCEDURE — 1123F ACP DISCUSS/DSCN MKR DOCD: CPT | Performed by: NURSE PRACTITIONER

## 2025-01-21 PROCEDURE — 99214 OFFICE O/P EST MOD 30 MIN: CPT | Performed by: NURSE PRACTITIONER

## 2025-01-21 PROCEDURE — 1159F MED LIST DOCD IN RCRD: CPT | Performed by: NURSE PRACTITIONER

## 2025-01-21 PROCEDURE — 3078F DIAST BP <80 MM HG: CPT | Performed by: NURSE PRACTITIONER

## 2025-01-21 PROCEDURE — 3074F SYST BP LT 130 MM HG: CPT | Performed by: NURSE PRACTITIONER

## 2025-01-21 PROCEDURE — 1158F ADVNC CARE PLAN TLK DOCD: CPT | Performed by: NURSE PRACTITIONER

## 2025-01-21 PROCEDURE — 3008F BODY MASS INDEX DOCD: CPT | Performed by: NURSE PRACTITIONER

## 2025-01-21 PROCEDURE — 87651 STREP A DNA AMP PROBE: CPT

## 2025-01-21 ASSESSMENT — PATIENT HEALTH QUESTIONNAIRE - PHQ9
2. FEELING DOWN, DEPRESSED OR HOPELESS: NOT AT ALL
SUM OF ALL RESPONSES TO PHQ9 QUESTIONS 1 AND 2: 0
1. LITTLE INTEREST OR PLEASURE IN DOING THINGS: NOT AT ALL

## 2025-01-21 ASSESSMENT — ENCOUNTER SYMPTOMS
DEPRESSION: 0
SORE THROAT: 1

## 2025-01-21 NOTE — PATIENT INSTRUCTIONS
It is likely that the reflux of GERD is causing your irritated throat.  Referral has been submitted to a gastroenterologist for follow-up.  Warm salt water gargles as needed for sore throat pain.  Please avoid spicy and acidic foods.    Warm fluids may also be soothing for the throat.  Throat lozenges may be helpful.  Also advise you to buy some over-the-counter Chloraseptic spray and use as needed.  Follow-up after you see GI.

## 2025-01-21 NOTE — PROGRESS NOTES
"Subjective   Patient ID: Bob Marquez is a 69 y.o. male who presents for Sore Throat (Sore throat x 3 weeks. Sometimes coughs up blood.).    Sore Throat   Pertinent negatives include no abdominal pain, diarrhea, trouble swallowing or vomiting.   Patient presents to clinic for evaluation of irritated throat x 3 weeks.  Patient states sometimes he spits up blood he is not coughing up blood.  He states the last time he spit up blood was 2 days ago blood was bright red.  He states he has GERD and he has noticed that his reflux has worsened over the last month.  He has been having reflux that has been causing throat irritation.  Patient states he has been taking omeprazole 20 mg daily.      Patient is a non-smoker quit in 2008    He denies fevers chills cough or any other symptoms.        Review of Systems   HENT:  Positive for sore throat. Negative for trouble swallowing and voice change.    Cardiovascular: Negative.    Gastrointestinal: Negative.  Negative for abdominal pain, blood in stool, constipation, diarrhea and vomiting.   Endocrine: Negative.    Genitourinary: Negative.    Skin: Negative.    All other systems reviewed and are negative.      Objective   /65 (BP Location: Left arm, Patient Position: Sitting, BP Cuff Size: Adult)   Pulse 91   Ht 1.88 m (6' 2\")   Wt 109 kg (239 lb 3.2 oz)   SpO2 93%   BMI 30.71 kg/m²     Physical Exam  Vitals reviewed.   Constitutional:       General: He is not in acute distress.     Appearance: Normal appearance. He is not ill-appearing or toxic-appearing.   HENT:      Mouth/Throat:      Mouth: Mucous membranes are moist.      Pharynx: Posterior oropharyngeal erythema present. No pharyngeal swelling or oropharyngeal exudate.      Comments: Posterior pharynx right side with small excoriated area.  No active bleeding noted.  Neurological:      Mental Status: He is alert.         Assessment/Plan   Diagnoses and all orders for this visit:  Gastroesophageal reflux " disease, unspecified whether esophagitis present  -     Referral to Gastroenterology; Future  -Omeprazole 20 mg daily and  Sore throat  -     Group A Streptococcus, PCR  -Warm salt water gargles  -.Throat lozenges Chloraseptic spray as needed.

## 2025-01-22 LAB — S PYO DNA THROAT QL NAA+PROBE: NOT DETECTED

## 2025-01-22 ASSESSMENT — ENCOUNTER SYMPTOMS
GASTROINTESTINAL NEGATIVE: 1
ENDOCRINE NEGATIVE: 1
VOICE CHANGE: 0
TROUBLE SWALLOWING: 0
VOMITING: 0
DIARRHEA: 0
ABDOMINAL PAIN: 0
CARDIOVASCULAR NEGATIVE: 1
CONSTIPATION: 0
BLOOD IN STOOL: 0

## 2025-03-25 ENCOUNTER — APPOINTMENT (OUTPATIENT)
Dept: GASTROENTEROLOGY | Facility: CLINIC | Age: 70
End: 2025-03-25
Payer: MEDICARE

## 2025-03-25 VITALS
HEIGHT: 74 IN | BODY MASS INDEX: 30.67 KG/M2 | HEART RATE: 94 BPM | SYSTOLIC BLOOD PRESSURE: 117 MMHG | WEIGHT: 239 LBS | DIASTOLIC BLOOD PRESSURE: 80 MMHG

## 2025-03-25 DIAGNOSIS — K21.9 GASTROESOPHAGEAL REFLUX DISEASE, UNSPECIFIED WHETHER ESOPHAGITIS PRESENT: ICD-10-CM

## 2025-03-25 DIAGNOSIS — Z12.11 COLON CANCER SCREENING: Primary | ICD-10-CM

## 2025-03-25 DIAGNOSIS — Z95.5 H/O RIGHT CORONARY ARTERY STENT PLACEMENT: ICD-10-CM

## 2025-03-25 DIAGNOSIS — Z86.79 HISTORY OF ATRIAL FIBRILLATION: Primary | ICD-10-CM

## 2025-03-25 PROCEDURE — 99203 OFFICE O/P NEW LOW 30 MIN: CPT | Performed by: INTERNAL MEDICINE

## 2025-03-25 PROCEDURE — 3074F SYST BP LT 130 MM HG: CPT | Performed by: INTERNAL MEDICINE

## 2025-03-25 PROCEDURE — 1159F MED LIST DOCD IN RCRD: CPT | Performed by: INTERNAL MEDICINE

## 2025-03-25 PROCEDURE — 1036F TOBACCO NON-USER: CPT | Performed by: INTERNAL MEDICINE

## 2025-03-25 PROCEDURE — 3008F BODY MASS INDEX DOCD: CPT | Performed by: INTERNAL MEDICINE

## 2025-03-25 PROCEDURE — 3079F DIAST BP 80-89 MM HG: CPT | Performed by: INTERNAL MEDICINE

## 2025-03-25 NOTE — PROGRESS NOTES
This 69-year-old man was referred here because of finding blood in his regurgitated material.  He tells me this is not from coughing.  He has a long history of gastroesophageal reflux for which she has been on omeprazole for many years.  He has not had any endoscopic evaluation he has not had any dysphagia or change in his voice.  He had a colonoscopy tells me about 15 years ago.  There is no anorexia or weight loss there is no history of melena or rectal bleeding.  He was referred here for evaluation of what appeared to be blood in the upper GI tract with a long history of reflux symptoms he is also on Plavix.    Past medical history in 2008 he had a bad stent and placed for coronary artery disease in 2011 he had 2 additional stents placed at [facility].  He has been on Plavix since 2008.  In 2019 he was briefly hospitalized at [place] for paroxysmal atrial fibrillation with reversal sinus rhythm with beta-blocker therapy.    Family history his sister had a cerebral aneurysm 1 brother had heart attack.  Personal history he quit smoking in 2008 he drinks alcohol on a social fashion.  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    A 10 point review of system is positive for chronic back pains and arthritis.  GI history as mentioned above no history of angina or congestive heart failure.  Had episode of atrial fibrillation in 2019 he is taking Plavix but no systemic anticoagulation.  He has no history of any TIAs and his overall health has been very good he is very active plays golf on a regular basis.  No urinary symptoms.  Appetite and weight is good.    Physical examination very pleasant healthy-appearing individual HEENT is unremarkable oral cavity is unremarkable carotid 2+ no bruit thyroid is not enlarged no adenopathy heart sounds were normal regular sinus rhythm normal no murmurs chest is clear to auscultation and percussion examination abdomen is abdomen normal bowel is quite  taut and deep palpation satisfactory examination is not possible no obvious masses or pulsatile masses or any hernias were noted no pitting edema mental status neuro grossly normal he has got quite a bit of arthritis in his back and define the finding difficult done lay down in the same position for any length of time.  Clinical impression most likely this man has longstanding history of gastroesophageal reflux he had what appeared to be a recent episode of bleeding and he brought up some blood.  He is on Plavix.  I would recommend that he should also have an upper GI endoscopy.  He should also have a chest x-ray but he is because he was a remote smoker.  He has not had a colonoscopy for several years and recommend a colonoscopy at the same time he has an upper GI endoscopy when he is Plavix will be held for 4 to 5 days at least.  I explained the situation is agreeable for the same.

## 2025-03-25 NOTE — PATIENT INSTRUCTIONS
I recommend you to have an upper gastrointestinal endoscopy as well as colonoscopy after stopping the Plavix for at least 4 to 5 days.  He should also have a chest x-ray.  Most likely your blood came from the gastrointestinal tract but because of your history of remote smoking a chest x-ray should be done as a minimum as well.

## 2025-03-31 RX ORDER — POLYETHYLENE GLYCOL 3350, SODIUM SULFATE ANHYDROUS, SODIUM BICARBONATE, SODIUM CHLORIDE, POTASSIUM CHLORIDE 236; 22.74; 6.74; 5.86; 2.97 G/4L; G/4L; G/4L; G/4L; G/4L
4 POWDER, FOR SOLUTION ORAL ONCE
Qty: 4000 ML | Refills: 0 | Status: SHIPPED | OUTPATIENT
Start: 2025-03-31 | End: 2025-03-31

## 2025-05-14 ENCOUNTER — APPOINTMENT (OUTPATIENT)
Dept: GASTROENTEROLOGY | Facility: HOSPITAL | Age: 70
End: 2025-05-14
Payer: MEDICARE

## 2025-07-07 ENCOUNTER — TELEPHONE (OUTPATIENT)
Dept: PRIMARY CARE | Facility: CLINIC | Age: 70
End: 2025-07-07
Payer: MEDICARE

## 2025-07-07 DIAGNOSIS — K21.9 GASTRO-ESOPHAGEAL REFLUX DISEASE WITHOUT ESOPHAGITIS: ICD-10-CM

## 2025-07-07 RX ORDER — OMEPRAZOLE 20 MG/1
20 CAPSULE, DELAYED RELEASE ORAL
Qty: 90 CAPSULE | Refills: 2 | Status: SHIPPED | OUTPATIENT
Start: 2025-07-07 | End: 2025-07-08 | Stop reason: ALTCHOICE

## 2025-07-08 ENCOUNTER — TELEPHONE (OUTPATIENT)
Dept: PRIMARY CARE | Facility: CLINIC | Age: 70
End: 2025-07-08
Payer: MEDICARE

## 2025-07-08 DIAGNOSIS — K21.9 GASTROESOPHAGEAL REFLUX DISEASE, UNSPECIFIED WHETHER ESOPHAGITIS PRESENT: Primary | ICD-10-CM

## 2025-07-08 RX ORDER — PANTOPRAZOLE SODIUM 20 MG/1
20 TABLET, DELAYED RELEASE ORAL
Qty: 30 TABLET | Refills: 11 | Status: SHIPPED | OUTPATIENT
Start: 2025-07-08 | End: 2026-07-08

## 2025-07-08 NOTE — TELEPHONE ENCOUNTER
Pharmacy left a VM stating omeprazole decreases the affect of plavix. They suggest pantoprazole. Please advise. Ty

## 2025-07-16 ENCOUNTER — ANESTHESIA EVENT (OUTPATIENT)
Dept: GASTROENTEROLOGY | Facility: HOSPITAL | Age: 70
End: 2025-07-16
Payer: MEDICARE

## 2025-07-16 ENCOUNTER — ANESTHESIA (OUTPATIENT)
Dept: GASTROENTEROLOGY | Facility: HOSPITAL | Age: 70
End: 2025-07-16
Payer: MEDICARE

## 2025-07-16 ENCOUNTER — HOSPITAL ENCOUNTER (OUTPATIENT)
Dept: GASTROENTEROLOGY | Facility: HOSPITAL | Age: 70
Discharge: HOME | End: 2025-07-16
Payer: MEDICARE

## 2025-07-16 ENCOUNTER — HOSPITAL ENCOUNTER (OUTPATIENT)
Dept: CARDIOLOGY | Facility: HOSPITAL | Age: 70
Discharge: HOME | End: 2025-07-16
Payer: MEDICARE

## 2025-07-16 VITALS
RESPIRATION RATE: 18 BRPM | BODY MASS INDEX: 30.56 KG/M2 | HEIGHT: 74 IN | OXYGEN SATURATION: 96 % | HEART RATE: 78 BPM | SYSTOLIC BLOOD PRESSURE: 147 MMHG | DIASTOLIC BLOOD PRESSURE: 93 MMHG | TEMPERATURE: 98.6 F | WEIGHT: 238.1 LBS

## 2025-07-16 DIAGNOSIS — Z12.11 ENCOUNTER FOR SCREENING FOR MALIGNANT NEOPLASM OF COLON: ICD-10-CM

## 2025-07-16 DIAGNOSIS — K21.9 GASTROESOPHAGEAL REFLUX DISEASE CONCURRENT WITH AND DUE TO PARAESOPHAGEAL HERNIA: ICD-10-CM

## 2025-07-16 DIAGNOSIS — K44.9 GASTROESOPHAGEAL REFLUX DISEASE CONCURRENT WITH AND DUE TO PARAESOPHAGEAL HERNIA: ICD-10-CM

## 2025-07-16 PROCEDURE — G0121 COLON CA SCRN NOT HI RSK IND: HCPCS | Performed by: INTERNAL MEDICINE

## 2025-07-16 PROCEDURE — 45378 DIAGNOSTIC COLONOSCOPY: CPT | Performed by: INTERNAL MEDICINE

## 2025-07-16 PROCEDURE — 2720000007 HC OR 272 NO HCPCS

## 2025-07-16 PROCEDURE — 43239 EGD BIOPSY SINGLE/MULTIPLE: CPT | Performed by: INTERNAL MEDICINE

## 2025-07-16 PROCEDURE — 3700000002 HC GENERAL ANESTHESIA TIME - EACH INCREMENTAL 1 MINUTE

## 2025-07-16 PROCEDURE — 93005 ELECTROCARDIOGRAM TRACING: CPT

## 2025-07-16 PROCEDURE — 43251 EGD REMOVE LESION SNARE: CPT | Performed by: INTERNAL MEDICINE

## 2025-07-16 PROCEDURE — 3700000001 HC GENERAL ANESTHESIA TIME - INITIAL BASE CHARGE

## 2025-07-16 PROCEDURE — 2500000004 HC RX 250 GENERAL PHARMACY W/ HCPCS (ALT 636 FOR OP/ED): Mod: JW | Performed by: ANESTHESIOLOGIST ASSISTANT

## 2025-07-16 PROCEDURE — 7100000009 HC PHASE TWO TIME - INITIAL BASE CHARGE

## 2025-07-16 PROCEDURE — 7100000010 HC PHASE TWO TIME - EACH INCREMENTAL 1 MINUTE

## 2025-07-16 RX ORDER — PROPOFOL 10 MG/ML
INJECTION, EMULSION INTRAVENOUS AS NEEDED
Status: DISCONTINUED | OUTPATIENT
Start: 2025-07-16 | End: 2025-07-16

## 2025-07-16 RX ORDER — METOPROLOL TARTRATE 1 MG/ML
5 INJECTION, SOLUTION INTRAVENOUS ONCE
Status: DISCONTINUED | OUTPATIENT
Start: 2025-07-16 | End: 2025-07-17 | Stop reason: HOSPADM

## 2025-07-16 RX ORDER — LIDOCAINE HCL/PF 100 MG/5ML
SYRINGE (ML) INTRAVENOUS AS NEEDED
Status: DISCONTINUED | OUTPATIENT
Start: 2025-07-16 | End: 2025-07-16

## 2025-07-16 RX ORDER — SODIUM CHLORIDE 0.9 % (FLUSH) 0.9 %
SYRINGE (ML) INJECTION AS NEEDED
Status: DISCONTINUED | OUTPATIENT
Start: 2025-07-16 | End: 2025-07-16

## 2025-07-16 RX ORDER — METOPROLOL TARTRATE 1 MG/ML
INJECTION, SOLUTION INTRAVENOUS
Status: DISPENSED
Start: 2025-07-16 | End: 2025-07-16

## 2025-07-16 RX ADMIN — PROPOFOL 150 MG: 10 INJECTION, EMULSION INTRAVENOUS at 12:53

## 2025-07-16 RX ADMIN — PROPOFOL 50 MG: 10 INJECTION, EMULSION INTRAVENOUS at 12:55

## 2025-07-16 RX ADMIN — PROPOFOL 50 MG: 10 INJECTION, EMULSION INTRAVENOUS at 13:13

## 2025-07-16 RX ADMIN — PROPOFOL 50 MG: 10 INJECTION, EMULSION INTRAVENOUS at 13:09

## 2025-07-16 RX ADMIN — LIDOCAINE HYDROCHLORIDE 100 MG: 20 INJECTION, SOLUTION INTRAVENOUS at 12:53

## 2025-07-16 RX ADMIN — PROPOFOL 50 MG: 10 INJECTION, EMULSION INTRAVENOUS at 13:15

## 2025-07-16 RX ADMIN — Medication 10 ML: at 13:51

## 2025-07-16 RX ADMIN — PROPOFOL 50 MG: 10 INJECTION, EMULSION INTRAVENOUS at 13:03

## 2025-07-16 RX ADMIN — PROPOFOL 50 MG: 10 INJECTION, EMULSION INTRAVENOUS at 13:21

## 2025-07-16 RX ADMIN — PROPOFOL 125 MCG/KG/MIN: 10 INJECTION, EMULSION INTRAVENOUS at 13:22

## 2025-07-16 RX ADMIN — PROPOFOL 50 MG: 10 INJECTION, EMULSION INTRAVENOUS at 12:58

## 2025-07-16 SDOH — HEALTH STABILITY: MENTAL HEALTH: CURRENT SMOKER: 0

## 2025-07-16 ASSESSMENT — COLUMBIA-SUICIDE SEVERITY RATING SCALE - C-SSRS
2. HAVE YOU ACTUALLY HAD ANY THOUGHTS OF KILLING YOURSELF?: NO
6. HAVE YOU EVER DONE ANYTHING, STARTED TO DO ANYTHING, OR PREPARED TO DO ANYTHING TO END YOUR LIFE?: NO
1. IN THE PAST MONTH, HAVE YOU WISHED YOU WERE DEAD OR WISHED YOU COULD GO TO SLEEP AND NOT WAKE UP?: NO

## 2025-07-16 ASSESSMENT — PAIN - FUNCTIONAL ASSESSMENT
PAIN_FUNCTIONAL_ASSESSMENT: 0-10

## 2025-07-16 ASSESSMENT — PAIN SCALES - GENERAL
PAINLEVEL_OUTOF10: 0 - NO PAIN
PAINLEVEL_OUTOF10: 0 - NO PAIN
PAIN_LEVEL: 0
PAINLEVEL_OUTOF10: 0 - NO PAIN
PAINLEVEL_OUTOF10: 0 - NO PAIN

## 2025-07-16 NOTE — H&P
This gentleman is being evaluated for episodes of bradycardia bloody gastric contents of.  He has also symptoms of reflux.  He is on Plavix for history of coronary artery disease and stent he also needs a screening colonoscopy.  His Plavix has been discontinued for 5 days

## 2025-07-16 NOTE — ANESTHESIA POSTPROCEDURE EVALUATION
Patient: Bob Marquez    Procedure Summary       Date: 07/16/25 Room / Location: [facility]    Anesthesia Start: 1245 Anesthesia Stop: 1355    Procedures:       COLONOSCOPY      EGD Diagnosis:       Encounter for screening for malignant neoplasm of colon      Gastroesophageal reflux disease concurrent with and due to paraesophageal hernia    Scheduled Providers: Luis Sun MD Responsible Provider: Harry Murillo MD    Anesthesia Type: MAC ASA Status: 3            Anesthesia Type: MAC    Vitals Value Taken Time   /72 07/16/25 14:16   Temp 37 °C (98.6 °F) 07/16/25 13:54   Pulse 78 07/16/25 14:25   Resp 18 07/16/25 14:15   SpO2 96 % 07/16/25 14:25   Vitals shown include unfiled device data.    Anesthesia Post Evaluation    Patient location during evaluation: PACU  Patient participation: complete - patient participated  Level of consciousness: sleepy but conscious  Pain score: 0  Pain management: adequate  Airway patency: patent  Cardiovascular status: acceptable and hemodynamically stable  Respiratory status: acceptable and face mask  Hydration status: acceptable  Postoperative Nausea and Vomiting: none        No notable events documented.

## 2025-07-16 NOTE — ANESTHESIA PREPROCEDURE EVALUATION
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 : N  Date of Last Liquid: 07/15/25  Time of Last Liquid: 2100  Date of Last Solid: 07/14/25  Time of Last Solid: 1500  Last Intake Type: Solid meal  Time of Last Void: 1124         Physical Exam    Airway  Mallampati: III  TM distance: <3 FB  Neck ROM: full  Mouth opening: 3 or more finger widths     Cardiovascular - normal exam  Rhythm: regular  Rate: normal     Dental - normal exam     Pulmonary - normal exam   Abdominal      Other findings: Patient given Metoprolol 5mg preop        Anesthesia Plan    History of general anesthesia?: yes  History of complications of general anesthesia?: no    ASA 3     MAC     The patient is not a current smoker.    intravenous induction   Anesthetic plan and risks discussed with patient.    Plan discussed with [facility].

## 2025-07-17 LAB
ATRIAL RATE: 89 BPM
P AXIS: 32 DEGREES
P OFFSET: 188 MS
P ONSET: 134 MS
PR INTERVAL: 162 MS
Q ONSET: 215 MS
QRS COUNT: 14 BEATS
QRS DURATION: 78 MS
QT INTERVAL: 360 MS
QTC CALCULATION(BAZETT): 438 MS
QTC FREDERICIA: 410 MS
R AXIS: 27 DEGREES
T AXIS: 41 DEGREES
T OFFSET: 395 MS
VENTRICULAR RATE: 89 BPM

## 2025-07-25 LAB
LABORATORY COMMENT REPORT: NORMAL
PATH REPORT.FINAL DX SPEC: NORMAL
PATH REPORT.GROSS SPEC: NORMAL
PATH REPORT.RELEVANT HX SPEC: NORMAL
PATH REPORT.TOTAL CANCER: NORMAL

## 2025-07-28 ENCOUNTER — RESULTS FOLLOW-UP (OUTPATIENT)
Dept: GASTROENTEROLOGY | Facility: HOSPITAL | Age: 70
End: 2025-07-28
Payer: MEDICARE

## 2025-07-28 NOTE — TELEPHONE ENCOUNTER
Pathology of the polypoid lesion in the stomach was a hyperplastic polyp is gastric gastric antral vascular ectasia biopsies esophagus were negative for Lora's esophagus discussed with the patient request follow-up in the office in about 6 to 8 weeks.